# Patient Record
Sex: MALE | URBAN - METROPOLITAN AREA
[De-identification: names, ages, dates, MRNs, and addresses within clinical notes are randomized per-mention and may not be internally consistent; named-entity substitution may affect disease eponyms.]

---

## 2024-01-25 ENCOUNTER — OFFICE VISIT (OUTPATIENT)
Dept: URBAN - METROPOLITAN AREA CLINIC 66 | Facility: CLINIC | Age: 44
End: 2024-01-25
Payer: COMMERCIAL

## 2024-01-25 ENCOUNTER — LAB OUTSIDE AN ENCOUNTER (OUTPATIENT)
Dept: URBAN - METROPOLITAN AREA CLINIC 66 | Facility: CLINIC | Age: 44
End: 2024-01-25

## 2024-01-25 VITALS
WEIGHT: 230 LBS | OXYGEN SATURATION: 98 % | HEART RATE: 92 BPM | SYSTOLIC BLOOD PRESSURE: 140 MMHG | DIASTOLIC BLOOD PRESSURE: 80 MMHG | HEIGHT: 72 IN

## 2024-01-25 DIAGNOSIS — R11.0 NAUSEA: ICD-10-CM

## 2024-01-25 DIAGNOSIS — K42.9 UMBILICAL HERNIA WITHOUT OBSTRUCTION AND WITHOUT GANGRENE: ICD-10-CM

## 2024-01-25 PROBLEM — 422587007: Status: ACTIVE | Noted: 2024-01-25

## 2024-01-25 PROCEDURE — 99204 OFFICE O/P NEW MOD 45 MIN: CPT | Performed by: INTERNAL MEDICINE

## 2024-01-25 NOTE — PHYSICAL EXAM GASTROINTESTINAL
Abdomen , soft, nontender, nondistended , no guarding or rigidity , no masses palpable , normal bowel sounds , Liver and Spleen , no hepatomegaly present , no hepatosplenomegaly , liver nontender , spleen not palpable , umbilical hernia present

## 2024-01-25 NOTE — HPI-TODAY'S VISIT:
44 y.o. WM with an umbilical hernia that has been causing some discomfort and episodic nausea who is here for evaluation. He denies any constipation or diarrhea, no gib. He is having to push in umbilical hernia and at times feels that it does not want to reduce. No family history for colon cancer. He has never had a colonoscopy. He is recommended to have a CT scan of abdomen/pelvis with oral contrast and will refer to general surgeon for umbilical hernia repair.

## 2024-02-22 ENCOUNTER — OV EP (OUTPATIENT)
Dept: URBAN - METROPOLITAN AREA CLINIC 66 | Facility: CLINIC | Age: 44
End: 2024-02-22